# Patient Record
Sex: FEMALE | Race: WHITE | NOT HISPANIC OR LATINO | URBAN - METROPOLITAN AREA
[De-identification: names, ages, dates, MRNs, and addresses within clinical notes are randomized per-mention and may not be internally consistent; named-entity substitution may affect disease eponyms.]

---

## 2019-12-10 NOTE — PATIENT DISCUSSION
Advised regular use of Amsler grid.
Despite some risk factors, the patient does not demonstrate definitive evidence of glaucoma at this time.
Discussed AREDS 2 supplements, UV protection and green leafy vegetables.
Discussed the importance of blood sugar control in the prevention of ocular complications.
It was discussed with the patient the importance of good control of their blood sugar, blood pressure, cholesterol, diet, exercise and weight under the guidance of their diabetic doctor to prevent/halt diabetic retinopathy.
Monitor.
Oct next year.
Patient understands condition, prognosis and need for follow up care.
Retinal exam findings communicated to Physician managing diabetes.
stable.
unknown

## 2020-05-05 NOTE — PATIENT DISCUSSION
Patient educated on condition.  Information literature dispensed.  Warning signs for RD discussed.  Call ASAP with any increase in symptoms.

## 2020-11-12 ENCOUNTER — PREPPED CHART (OUTPATIENT)
Dept: URBAN - METROPOLITAN AREA CLINIC 16 | Facility: CLINIC | Age: 60
End: 2020-11-12

## 2021-11-10 NOTE — PROCEDURE NOTE: CLINICAL
PROCEDURE NOTE: Epilation #2 Left Lower Lid. Anesthesia: Topical. Prior to treatment, the risks/benefits/alternatives were discussed. The patient wished to proceed with procedure. Aberrant lashes removed from * lid(s) using microforcep. Patient tolerated procedure well. There were no complications. Post-op instructions given. Prem Cruz

## 2021-12-01 ASSESSMENT — TONOMETRY
OS_IOP_MMHG: 10
OD_IOP_MMHG: 10

## 2021-12-02 ENCOUNTER — ESTABLISHED COMPREHENSIVE EXAM (OUTPATIENT)
Dept: URBAN - METROPOLITAN AREA CLINIC 16 | Facility: CLINIC | Age: 61
End: 2021-12-02

## 2021-12-02 DIAGNOSIS — Z01.00: ICD-10-CM

## 2021-12-02 DIAGNOSIS — H25.13: ICD-10-CM

## 2021-12-02 DIAGNOSIS — H52.4: ICD-10-CM

## 2021-12-02 DIAGNOSIS — H52.221: ICD-10-CM

## 2021-12-02 DIAGNOSIS — H52.13: ICD-10-CM

## 2021-12-02 DIAGNOSIS — H11.153: ICD-10-CM

## 2021-12-02 PROCEDURE — 92015 DETERMINE REFRACTIVE STATE: CPT

## 2021-12-02 PROCEDURE — 92014 COMPRE OPH EXAM EST PT 1/>: CPT

## 2021-12-02 PROCEDURE — 92310C CONTACT LENS 75

## 2021-12-02 ASSESSMENT — TONOMETRY
OD_IOP_MMHG: 11
OS_IOP_MMHG: 13

## 2022-12-06 ENCOUNTER — COMPREHENSIVE EXAM (OUTPATIENT)
Dept: URBAN - METROPOLITAN AREA CLINIC 16 | Facility: CLINIC | Age: 62
End: 2022-12-06

## 2022-12-06 PROCEDURE — 92014 COMPRE OPH EXAM EST PT 1/>: CPT

## 2022-12-06 PROCEDURE — 92310C CONTACT LENS 75

## 2022-12-06 PROCEDURE — 92015 DETERMINE REFRACTIVE STATE: CPT

## 2022-12-06 ASSESSMENT — TONOMETRY
OD_IOP_MMHG: 16
OS_IOP_MMHG: 16

## 2024-02-21 ENCOUNTER — COMPREHENSIVE EXAM (OUTPATIENT)
Dept: URBAN - METROPOLITAN AREA CLINIC 16 | Facility: CLINIC | Age: 64
End: 2024-02-21

## 2024-02-21 DIAGNOSIS — H11.153: ICD-10-CM

## 2024-02-21 DIAGNOSIS — Z01.00: ICD-10-CM

## 2024-02-21 DIAGNOSIS — H25.13: ICD-10-CM

## 2024-02-21 DIAGNOSIS — H52.13: ICD-10-CM

## 2024-02-21 DIAGNOSIS — H52.4: ICD-10-CM

## 2024-02-21 DIAGNOSIS — H52.221: ICD-10-CM

## 2024-02-21 PROCEDURE — 92310C CONTACT LENS 75

## 2024-02-21 PROCEDURE — 92015 DETERMINE REFRACTIVE STATE: CPT

## 2024-02-21 PROCEDURE — 92014 COMPRE OPH EXAM EST PT 1/>: CPT

## 2024-02-21 ASSESSMENT — TONOMETRY
OS_IOP_MMHG: 12
OD_IOP_MMHG: 10

## 2025-05-15 ENCOUNTER — COMPREHENSIVE EXAM (OUTPATIENT)
Age: 65
End: 2025-05-15

## 2025-05-15 DIAGNOSIS — Z01.00: ICD-10-CM

## 2025-05-15 DIAGNOSIS — H52.13: ICD-10-CM

## 2025-05-15 DIAGNOSIS — H25.13: ICD-10-CM

## 2025-05-15 PROCEDURE — 92015 DETERMINE REFRACTIVE STATE: CPT

## 2025-05-15 PROCEDURE — 92014 COMPRE OPH EXAM EST PT 1/>: CPT

## 2025-05-15 PROCEDURE — 92310-1 LEVEL 1 SOFT LENS UPDATE
